# Patient Record
Sex: FEMALE | URBAN - METROPOLITAN AREA
[De-identification: names, ages, dates, MRNs, and addresses within clinical notes are randomized per-mention and may not be internally consistent; named-entity substitution may affect disease eponyms.]

---

## 2017-07-28 ENCOUNTER — OFFICE VISIT (OUTPATIENT)
Dept: INTERPRETER SERVICES | Facility: CLINIC | Age: 42
End: 2017-07-28

## 2017-08-09 ENCOUNTER — OFFICE VISIT (OUTPATIENT)
Dept: OBGYN | Facility: CLINIC | Age: 42
End: 2017-08-09
Payer: COMMERCIAL

## 2017-08-09 VITALS
DIASTOLIC BLOOD PRESSURE: 74 MMHG | HEART RATE: 83 BPM | SYSTOLIC BLOOD PRESSURE: 120 MMHG | HEIGHT: 61 IN | WEIGHT: 177 LBS | BODY MASS INDEX: 33.42 KG/M2

## 2017-08-09 DIAGNOSIS — N97.9 FEMALE INFERTILITY: Primary | ICD-10-CM

## 2017-08-09 DIAGNOSIS — Z87.59 HISTORY OF SPONTANEOUS ABORTION: ICD-10-CM

## 2017-08-09 PROBLEM — E66.9 OBESITY (BMI 30-39.9): Status: ACTIVE | Noted: 2017-08-09

## 2017-08-09 PROCEDURE — 99212 OFFICE O/P EST SF 10 MIN: CPT | Mod: ZF

## 2017-08-09 ASSESSMENT — PAIN SCALES - GENERAL: PAINLEVEL: NO PAIN (0)

## 2017-08-09 NOTE — PROGRESS NOTES
"New Patient Note    S: Lisbeth is a 41 yo  with history of two 1st trimester spontaneous abortions and type 2 diabetes mellitus here for infertility assessment.  She is currently sexually active with her partner. She reports regular 28 days periods that last for 3 days.  Her last period was .      O:  Medical history:  Type 2 Diabetes Mellitus - not checking sugars    Surgical history:  None    Ob hx:   - spontaneous  around 12 weeks gestation   - term pregnancy, child  at age 12 of leukemia   - spontaneous  around 6 weeks gestation    Gyn hx:  Denies history of STDs  Denies history of abnormal pap smears  Last pap  - normal per patient    Medications:  PNV  500 mg metformin daily    Allergies: NKDA    /74  Pulse 83  Ht 1.549 m (5' 1\")  Wt 80.3 kg (177 lb)  LMP 2017  Breastfeeding? Unknown  BMI 33.44 kg/m2  General - alert, oriented, in no acute distress  Pulm - normal respiratory effort, no wheezing  Neuro - normal gross movements    A/P:  Lisbeth is a 41 yo  with history of two 1st trimester spontaneous abortions and type 2 diabetes mellitus here for infertility assessment.    1. Infertility workup likely due to age  Ordered labs today for her to return on day 3 of her menses to have drawn: anti-mullerian hormone, estradiol, FSH, hgb A1c, TSH with T4 reflex  Referred her to an BRE group that may take insurance once these labs have been drawn.    She would like to be called with the lab results.    2. T2 Diabetes mellitus - on 500 mg metformin daily  Encourage her to check blood sugar to make sure she is being well controlled as this can also influence her fertility  She has supplies at home for blood sugars    Amy Schumer, MD  Ob/Gyn PGY-1    The Patient was seen in Resident Continuity Clinic by    SCHUMER, AMY  [facility].  I was present for history and discussion of plan with patient. Assessment and plan were jointly made.    Jessica" Avtar Craig MD

## 2017-08-09 NOTE — MR AVS SNAPSHOT
After Visit Summary   2017    Lisbeth Morales    MRN: [id number]           Patient Information     Date Of Birth          1975        Visit Information        Provider Department      2017 1:30 PM Schumer, Amy, MD; [facility]        Today's Diagnoses     Female infertility    -  1    History of spontaneous            Follow-ups after your visit        Follow-up notes from your care team     Return as needed.      Who to contact     Please call your clinic at [phone] to:    Ask questions about your health    Make or cancel appointments    Discuss your medicines    Learn about your test results    Speak to your doctor   If you have compliments or concerns about an experience at your clinic, or if you wish to file a complaint, please contact [facility] Patient Relations at [phone] or email us at [email or web address]         Additional Information About Your Visit        MyChart Information     Iddictiont is an electronic gateway that provides easy, online access to your medical records. With GranData, you can request a clinic appointment, read your test results, renew a prescription or communicate with your care team.     To sign up for Iddictiont visit the website at [email or web address]   You will be asked to enter the access code listed below, as well as some personal information. Please follow the directions to create your username and password.     Your access code is: [email or web address]  Expires: 10/18/2017  6:30 AM     Your access code will  in 90 days. If you need help or a new code, please contact your [facility] or call [phone] for assistance.        Care EveryWhere ID     This is your Care EveryWhere ID. This could be used by other organizations to access your [place] medical records  [id number]        Your Vitals Were     Pulse Height Last  "Period Breastfeeding? BMI (Body Mass Index)       83 1.549 m (5' 1\") 07/14/2017 Unknown 33.44 kg/m2        Blood Pressure from Last 3 Encounters:   08/09/17 120/74   08/13/16 135/69   08/11/16 111/77    Weight from Last 3 Encounters:   08/09/17 80.3 kg (177 lb)   08/11/16 78.5 kg (173 lb 1.6 oz)              We Performed the Following     hCG qual urine POCT        Primary Care Provider    [facility]       No address on file        Equal Access to Services     [facility]: Hadii milagro ch Soignacio, waaxda luqadaha, qaybta kaalmada dilma, amaury lopez . So [facility] [phone].    ATENCIÓN: Si habla español, tiene a vegas disposición servicios gratuitos de asistencia lingüística. Llame al [phone].    We comply with applicable federal civil rights laws and [place] laws. We do not discriminate on the basis of race, color, national origin, age, disability sex, sexual orientation or gender identity.            Thank you!     Thank you for choosing [facility]  for your care. Our goal is always to provide you with excellent care. Hearing back from our patients is one way we can continue to improve our services. Please take a few minutes to complete the written survey that you may receive in the mail after your visit with us. Thank you!             Your Updated Medication List - Protect others around you: Learn how to safely use, store and throw away your medicines at www.disposemymeds.org.          This list is accurate as of: 8/9/17 11:59 PM.  Always use your most recent med list.                   Brand Name Dispense Instructions for use Diagnosis    metFORMIN 500 MG tablet    GLUCOPHAGE     Take 500 mg by mouth        PRENATAL VITAMIN PO           RA BLOOD GLUCOSE MONITOR Melba      as needed for blood glucose monitoring. Check blood glucoses 2-3 times per week and record in a book.  Bring book to all appointments.          "

## 2017-08-17 DIAGNOSIS — N97.9 FEMALE INFERTILITY: ICD-10-CM

## 2017-08-17 LAB
ESTRADIOL SERPL-MCNC: 64 PG/ML
FSH SERPL-ACNC: 9.7 IU/L
HBA1C MFR BLD: 6.3 % (ref 4.3–6)
TSH SERPL DL<=0.005 MIU/L-ACNC: 0.65 MU/L (ref 0.4–4)

## 2017-08-17 PROCEDURE — 84443 ASSAY THYROID STIM HORMONE: CPT | Performed by: STUDENT IN AN ORGANIZED HEALTH CARE EDUCATION/TRAINING PROGRAM

## 2017-08-17 PROCEDURE — 83520 IMMUNOASSAY QUANT NOS NONAB: CPT | Performed by: STUDENT IN AN ORGANIZED HEALTH CARE EDUCATION/TRAINING PROGRAM

## 2017-08-17 PROCEDURE — 36415 COLL VENOUS BLD VENIPUNCTURE: CPT | Performed by: STUDENT IN AN ORGANIZED HEALTH CARE EDUCATION/TRAINING PROGRAM

## 2017-08-17 PROCEDURE — 83036 HEMOGLOBIN GLYCOSYLATED A1C: CPT | Performed by: STUDENT IN AN ORGANIZED HEALTH CARE EDUCATION/TRAINING PROGRAM

## 2017-08-17 PROCEDURE — 83001 ASSAY OF GONADOTROPIN (FSH): CPT | Performed by: STUDENT IN AN ORGANIZED HEALTH CARE EDUCATION/TRAINING PROGRAM

## 2017-08-17 PROCEDURE — 82670 ASSAY OF TOTAL ESTRADIOL: CPT | Performed by: STUDENT IN AN ORGANIZED HEALTH CARE EDUCATION/TRAINING PROGRAM

## 2017-08-19 LAB — MIS SERPL-MCNC: 0.12 NG/ML

## 2017-08-25 ENCOUNTER — OFFICE VISIT (OUTPATIENT)
Dept: OBGYN | Facility: CLINIC | Age: 42
End: 2017-08-25
Payer: COMMERCIAL

## 2017-08-25 VITALS
BODY MASS INDEX: 33.61 KG/M2 | SYSTOLIC BLOOD PRESSURE: 120 MMHG | DIASTOLIC BLOOD PRESSURE: 69 MMHG | WEIGHT: 177.9 LBS | HEART RATE: 73 BPM

## 2017-08-25 DIAGNOSIS — N97.9 FEMALE INFERTILITY: Primary | ICD-10-CM

## 2017-08-25 PROCEDURE — 99212 OFFICE O/P EST SF 10 MIN: CPT | Mod: ZF

## 2017-08-25 NOTE — NURSING NOTE
Chief Complaint   Patient presents with     Consult For     f/u lab results       Carmela Jauregui, [facility] 8/25/2017

## 2017-08-25 NOTE — PROGRESS NOTES
OBGYN Progress Note    S: Lisbeth is a 41 yo  with history of two 1st trimester spontaneous abortions and type 2 diabetes mellitus here for lab results after infertility assessment. Her day three FSH and estradiol were done showing she is not in menopause; however, her AMH is very low at 0.117, which is a marker of her ovarian reserve. Discussed what a low AMH means for the chance of fertility. Also recommended that given her age and AMH level, the best chance for her to have a child is with an infertility specialist.    She is currently sexually active with her male partner 2-3x/week. She has the same partner as with her prior pregnancies. Her partner has also fathered three other children- the youngest of which is 6 years old. The partner has never had a semen analysis. They have been trying for the past three years.  She reports regular 28 days periods that last for 3 days.     Medical history:  Type 2 Diabetes Mellitus    Surgical history:  None    Ob hx:   - spontaneous  around 12 weeks gestation   - term pregnancy, child  at age 12 of leukemia   - spontaneous  around 6 weeks gestation    Gyn hx:  Denies history of STDs. No history of tubal ligations  Denies history of abnormal pap smears  Last pap  - normal per patient    Medications:  PNV  500 mg metformin daily    Allergies: NKDA    O:  LMP 2017  /69 (BP Location: Left arm, Patient Position: Chair, Cuff Size: Adult Regular)  Pulse 73  Wt 80.7 kg (177 lb 14.4 oz)  LMP 2017  BMI 33.61 kg/m2    General - alert, oriented, in no acute distress      A/P:  Lisbeth is a 41 yo  with history of two 1st trimester spontaneous abortions and type 2 diabetes mellitus here for lab results for infertility assessment.    1. 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 Did briefly discuss IVF and what that would entail. She states that the main issue is money  - Did discuss ovulation predictor kits as well to see if she is ovulating with timed voiding. She seemed interested but I did re-iterate that her best chance of having a baby is with an BRE and most likely IVF  - Encouraged her to return to OBGYN clinic if care is prohibitive at BRE offices.  Further work up to do include semen analysis, uterine cavity assessment with HSG, and pelvic ultrasound.     2. T2 Diabetes mellitus - on 500 mg metformin daily. Hgb A1c 6.3%  - Encourage her to check blood sugar to make sure she is being well controlled as this can also influence her fertility.  - She has supplies at home for blood sugars    Skyla Curry MD   OBGYN, PGY-3  8/25/2017 1:47 PM    Patient was seen by the resident in Continuity of Care Clinic.  I reviewed the history & exam.  The patient's assessment and plan were made jointly.    Anika Hayward MD MPH

## 2017-08-25 NOTE — MR AVS SNAPSHOT
After Visit Summary   2017    Lisbeth Morales    MRN: [id number]           Patient Information     Date Of Birth          1975        Visit Information        Provider Department      2017 1:30 PM Skyla Curry MD; [facility]        Today's Diagnoses     Female infertility    -  1       Follow-ups after your visit        Who to contact     Please call your clinic at [phone] to:    Ask questions about your health    Make or cancel appointments    Discuss your medicines    Learn about your test results    Speak to your doctor   If you have compliments or concerns about an experience at your clinic, or if you wish to file a complaint, please contact [facility] Patient Relations at [phone] or email us at [email or web address]         Additional Information About Your Visit        MyChart Information     Dailymotion is an electronic gateway that provides easy, online access to your medical records. With Dailymotion, you can request a clinic appointment, read your test results, renew a prescription or communicate with your care team.     To sign up for Naked Winest visit the website at [email or web address]   You will be asked to enter the access code listed below, as well as some personal information. Please follow the directions to create your username and password.     Your access code is: [email or web address]  Expires: 10/18/2017  6:30 AM     Your access code will  in 90 days. If you need help or a new code, please contact your [facility] or call [phone] for assistance.        Care EveryWhere ID     This is your Care EveryWhere ID. This could be used by other organizations to access your [place] medical records  [id number]        Your Vitals Were     Pulse Last Period BMI (Body Mass Index)             73 2017 33.61 kg/m2          Blood Pressure from Last  3 Encounters:   08/25/17 120/69   08/09/17 120/74   08/13/16 135/69    Weight from Last 3 Encounters:   08/25/17 80.7 kg (177 lb 14.4 oz)   08/09/17 80.3 kg (177 lb)   08/11/16 78.5 kg (173 lb 1.6 oz)              Today, you had the following     No orders found for display       Primary Care Provider    [facility]       No address on file        Equal Access to Services     [facility] : Hadii aad ku hadasho Soomaali, waaxda luqadaha, qaybta kaalmada adeegyada, waxay garrettin azael lopez . So [facility] [phone].    ATENCIÓN: Si habla español, tiene a vegas disposición servicios gratuitos de asistencia lingüística. Llame al [phone].    We comply with applicable federal civil rights laws and [place] laws. We do not discriminate on the basis of race, color, national origin, age, disability sex, sexual orientation or gender identity.            Thank you!     Thank you for choosing [facility]  for your care. Our goal is always to provide you with excellent care. Hearing back from our patients is one way we can continue to improve our services. Please take a few minutes to complete the written survey that you may receive in the mail after your visit with us. Thank you!             Your Updated Medication List - Protect others around you: Learn how to safely use, store and throw away your medicines at www.disposemymeds.org.          This list is accurate as of: 8/25/17 11:59 PM.  Always use your most recent med list.                   Brand Name Dispense Instructions for use Diagnosis    metFORMIN 500 MG tablet    GLUCOPHAGE     Take 500 mg by mouth        PRENATAL VITAMIN PO           RA BLOOD GLUCOSE MONITOR Melba      as needed for blood glucose monitoring. Check blood glucoses 2-3 times per week and record in a book.  Bring book to all appointments.